# Patient Record
Sex: FEMALE | Race: WHITE | NOT HISPANIC OR LATINO | Employment: OTHER | URBAN - METROPOLITAN AREA
[De-identification: names, ages, dates, MRNs, and addresses within clinical notes are randomized per-mention and may not be internally consistent; named-entity substitution may affect disease eponyms.]

---

## 2018-09-26 ENCOUNTER — APPOINTMENT (OUTPATIENT)
Dept: RADIOLOGY | Facility: CLINIC | Age: 63
End: 2018-09-26
Payer: MEDICARE

## 2018-09-26 ENCOUNTER — OFFICE VISIT (OUTPATIENT)
Dept: URGENT CARE | Facility: CLINIC | Age: 63
End: 2018-09-26
Payer: MEDICARE

## 2018-09-26 VITALS
WEIGHT: 204 LBS | HEIGHT: 68 IN | RESPIRATION RATE: 18 BRPM | OXYGEN SATURATION: 95 % | TEMPERATURE: 99.5 F | HEART RATE: 100 BPM | DIASTOLIC BLOOD PRESSURE: 60 MMHG | SYSTOLIC BLOOD PRESSURE: 100 MMHG | BODY MASS INDEX: 30.92 KG/M2

## 2018-09-26 DIAGNOSIS — M54.42 CHRONIC LEFT-SIDED LOW BACK PAIN WITH LEFT-SIDED SCIATICA: Primary | ICD-10-CM

## 2018-09-26 DIAGNOSIS — M54.42 CHRONIC LEFT-SIDED LOW BACK PAIN WITH LEFT-SIDED SCIATICA: ICD-10-CM

## 2018-09-26 DIAGNOSIS — G89.29 CHRONIC LEFT-SIDED LOW BACK PAIN WITH LEFT-SIDED SCIATICA: ICD-10-CM

## 2018-09-26 DIAGNOSIS — G89.29 CHRONIC LEFT-SIDED LOW BACK PAIN WITH LEFT-SIDED SCIATICA: Primary | ICD-10-CM

## 2018-09-26 PROCEDURE — 72100 X-RAY EXAM L-S SPINE 2/3 VWS: CPT

## 2018-09-26 PROCEDURE — 99213 OFFICE O/P EST LOW 20 MIN: CPT | Performed by: PHYSICIAN ASSISTANT

## 2018-09-26 PROCEDURE — G0463 HOSPITAL OUTPT CLINIC VISIT: HCPCS | Performed by: PHYSICIAN ASSISTANT

## 2018-09-26 RX ORDER — FUROSEMIDE 40 MG/1
40 TABLET ORAL 2 TIMES DAILY
COMMUNITY

## 2018-09-26 RX ORDER — ASPIRIN 325 MG
325 TABLET ORAL DAILY
COMMUNITY

## 2018-09-26 RX ORDER — LANOLIN ALCOHOL/MO/W.PET/CERES
CREAM (GRAM) TOPICAL DAILY
COMMUNITY

## 2018-09-26 RX ORDER — POTASSIUM CHLORIDE 20 MEQ/1
20 TABLET, EXTENDED RELEASE ORAL 2 TIMES DAILY
COMMUNITY
End: 2018-10-19 | Stop reason: SDUPTHER

## 2018-09-26 RX ORDER — METHYLPREDNISOLONE 4 MG/1
TABLET ORAL
Qty: 21 TABLET | Refills: 0 | Status: SHIPPED | OUTPATIENT
Start: 2018-09-26 | End: 2018-10-17 | Stop reason: ALTCHOICE

## 2018-09-26 RX ORDER — FOLIC ACID 1 MG/1
TABLET ORAL DAILY
COMMUNITY

## 2018-09-26 RX ORDER — GABAPENTIN 300 MG/1
100 CAPSULE ORAL 3 TIMES DAILY
COMMUNITY

## 2018-09-26 RX ORDER — SERTRALINE HYDROCHLORIDE 25 MG/1
50 TABLET, FILM COATED ORAL DAILY
COMMUNITY

## 2018-09-26 RX ORDER — METAXALONE 800 MG/1
800 TABLET ORAL 3 TIMES DAILY
Qty: 30 TABLET | Refills: 0 | Status: SHIPPED | OUTPATIENT
Start: 2018-09-26

## 2018-09-26 RX ORDER — DIPHENOXYLATE HYDROCHLORIDE AND ATROPINE SULFATE 2.5; .025 MG/1; MG/1
1 TABLET ORAL DAILY
COMMUNITY

## 2018-09-26 RX ORDER — LEVOTHYROXINE SODIUM 0.1 MG/1
112 TABLET ORAL DAILY
COMMUNITY
End: 2018-10-17 | Stop reason: SDUPTHER

## 2018-09-26 RX ORDER — DONEPEZIL HYDROCHLORIDE 5 MG/1
5 TABLET, FILM COATED ORAL
COMMUNITY

## 2018-09-26 RX ORDER — RANITIDINE 150 MG/1
150 CAPSULE ORAL 2 TIMES DAILY
COMMUNITY

## 2018-09-26 RX ORDER — MIRTAZAPINE 45 MG/1
45 TABLET, FILM COATED ORAL
COMMUNITY
End: 2018-10-19 | Stop reason: SDUPTHER

## 2018-09-26 NOTE — PATIENT INSTRUCTIONS
will start steroids and muscle relaxers  This is an acute on chronic flare with degenerative disc disease on her x-ray  She may need establishment follow up with pain management or spine in the area  We did discuss the comprehensive spine center in physical therapy  Due to her cancer history she is really just looking to get to baseline and more comfort so I discussed some physical therapy and medication for her pain with her  She agrees to this plan

## 2018-09-26 NOTE — PROGRESS NOTES
[address] [facility]        NAME: Eric Hollins is a 66 y o  female  : 1955    MRN: [id number]  DATE: 2018  TIME: 4:08 PM    Assessment and Plan   Chronic left-sided low back pain with left-sided sciatica [M54 42, G89 29]  1  Chronic left-sided low back pain with left-sided sciatica  XR spine lumbar 2 or 3 views injury    Methylprednisolone 4 MG TBPK    metaxalone (SKELAXIN) 800 mg tablet    Ambulatory Referral to Comprehensive [facility]         Patient Instructions      will start steroids and muscle relaxers  This is an acute on chronic flare with degenerative disc disease on her x-ray  She may need establishment follow up with pain management or spine in the area  We did discuss the comprehensive spine center in physical therapy  Due to her cancer history she is really just looking to get to baseline and more comfort so I discussed some physical therapy and medication for her pain with her  She agrees to this plan  Follow up with PCP in 3-5 days  Proceed to  ER if symptoms worsen  Chief Complaint     Chief Complaint   Patient presents with    Back Pain     ongoing, getting worse for the past 5 days +, goes into the lower back/hip/groin, pins/needles into toes         History of Present Illness       66 y o  Female presents to the office with PMH significant for lung cancer with a CC of "hip pain" x  5 days  Pt states she has had chronic LBP since having surgery on a herniated disc at L4L5 six years ago, but is now experiencing new pain "from her back that shoots into her groin and down to her foot " Pt states that there are "pins and needles" down the side of her left leg and calf, and on both the top and bottom of her left foot  She states the left leg frequently goes numb  She denies any trauma, falls, or changes to activity  Pt denies any symptoms on her right lower extremity  No bowel or bladder changes          Review of Systems   Review of Systems      Current Medications       Current Outpatient Prescriptions:     aspirin 325 mg tablet, Take 325 mg by mouth daily, Disp: , Rfl:     cyanocobalamin (VITAMIN B-12) 1,000 mcg tablet, Take by mouth daily, Disp: , Rfl:     donepezil (ARICEPT) 5 mg tablet, Take 5 mg by mouth daily at bedtime, Disp: , Rfl:     folic acid (FOLVITE) 1 mg tablet, Take by mouth daily, Disp: , Rfl:     furosemide (LASIX) 40 mg tablet, Take 40 mg by mouth 2 (two) times a day, Disp: , Rfl:     gabapentin (NEURONTIN) 300 mg capsule, Take 100 mg by mouth 3 (three) times a day, Disp: , Rfl:     levothyroxine 100 mcg tablet, Take 112 mcg by mouth daily, Disp: , Rfl:     mirtazapine (REMERON) 45 MG tablet, Take 45 mg by mouth daily at bedtime, Disp: , Rfl:     multivitamin (THERAGRAN) TABS, Take 1 tablet by mouth daily, Disp: , Rfl:     potassium chloride (K-DUR,KLOR-CON) 20 mEq tablet, Take 20 mEq by mouth 2 (two) times a day, Disp: , Rfl:     ranitidine (ZANTAC) 150 MG capsule, Take 150 mg by mouth 2 (two) times a day, Disp: , Rfl:     sertraline (ZOLOFT) 25 mg tablet, Take 50 mg by mouth daily, Disp: , Rfl:     metaxalone (SKELAXIN) 800 mg tablet, Take 1 tablet (800 mg total) by mouth 3 (three) times a day, Disp: 30 tablet, Rfl: 0    Methylprednisolone 4 MG TBPK, Use as directed on package, Disp: 21 tablet, Rfl: 0    Current Allergies     Allergies as of 09/26/2018 - Reviewed 09/26/2018   Allergen Reaction Noted    Heparin  09/26/2018    Penicillins  09/26/2018    Taxotere [docetaxel]  09/26/2018            The following portions of the patient's history were reviewed and updated as appropriate: allergies, current medications, past family history, past medical history, past social history, past surgical history and problem list      Past Medical History:   Diagnosis Date    Cancer ([facility] [address]     Depression     Disease of thyroid gland     Edema        Past Surgical History:   Procedure Laterality Date    ADENOIDECTOMY      BACK SURGERY   SECTION      CHOLECYSTECTOMY      EYE SURGERY      TONSILLECTOMY         Family History   Problem Relation Age of Onset    Cancer Mother     Cancer Father          Medications have been verified  Objective   /60 (BP Location: Right arm, Patient Position: Sitting, Cuff Size: Large)   Pulse 100   Temp 99 5 °F (37 5 °C) (Tympanic)   Resp 18   Ht 5' 8" (1 727 m)   Wt 92 5 kg (204 lb)   SpO2 95%   BMI 31 02 kg/m²        Physical Exam     Physical Exam   Constitutional: She appears well-developed and well-nourished  Musculoskeletal:     Lumbar spine tender palpation over L3-4 and 5 region  She is tender over bilateral SI joints worse on the left  She has some tenderness over the left buttock and piriformis region  Painful straight leg raise and paresthesias on the left  Lower extremity strength 4/5  On the left and 5/5 on the right  She has painful back flexion, lateral flexion to the left

## 2018-09-27 ENCOUNTER — TELEPHONE (OUTPATIENT)
Dept: PHYSICAL THERAPY | Facility: OTHER | Age: 63
End: 2018-09-27

## 2018-09-28 ENCOUNTER — NURSE TRIAGE (OUTPATIENT)
Dept: PHYSICAL THERAPY | Facility: OTHER | Age: 63
End: 2018-09-28

## 2018-09-28 ENCOUNTER — TELEPHONE (OUTPATIENT)
Dept: PAIN MEDICINE | Facility: MEDICAL CENTER | Age: 63
End: 2018-09-28

## 2018-09-28 DIAGNOSIS — M54.42 CHRONIC BILATERAL LOW BACK PAIN WITH LEFT-SIDED SCIATICA: Primary | ICD-10-CM

## 2018-09-28 DIAGNOSIS — G89.29 CHRONIC BILATERAL LOW BACK PAIN WITH LEFT-SIDED SCIATICA: Primary | ICD-10-CM

## 2018-09-28 NOTE — TELEPHONE ENCOUNTER
Spoke with both patient and patient's sister in law  Patient has stage IV lung cancer and stopped taking chemotherapy 1 5 to 2 years ago  Patient reports no medication for pain or symptoms  Patient had laminectomy 10 years ago  Patient now lives with sister in law and brother  Sister in law, Priyanka Dumont, states patient was in a poor living situation prior to moving in with them  Priyanka Sylvesters states patient has already improved emotionally with a more positive living situation and getting care coordinated for her  Please contact sister in law for appointments - sister in law takes patient to appointments  Reason for Disposition   Is this a chronic condition? Additional Information   Negative: Has the patient experienced major trauma? (fall from height, high speed collision, direct blow to spine) and is also experiencing nausea, light-headedness, or loss of consciousness? 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

## 2018-10-06 ENCOUNTER — TRANSCRIBE ORDERS (OUTPATIENT)
Dept: ADMINISTRATIVE | Facility: HOSPITAL | Age: 63
End: 2018-10-06

## 2018-10-06 ENCOUNTER — APPOINTMENT (OUTPATIENT)
Dept: LAB | Facility: CLINIC | Age: 63
End: 2018-10-06
Payer: MEDICARE

## 2018-10-06 DIAGNOSIS — E03.9 MYXEDEMA HEART DISEASE: Primary | ICD-10-CM

## 2018-10-06 DIAGNOSIS — N18.30 CHRONIC KIDNEY DISEASE, STAGE III (MODERATE) (HCC): ICD-10-CM

## 2018-10-06 DIAGNOSIS — E03.9 ACQUIRED HYPOTHYROIDISM: ICD-10-CM

## 2018-10-06 DIAGNOSIS — I51.9 MYXEDEMA HEART DISEASE: Primary | ICD-10-CM

## 2018-10-06 DIAGNOSIS — Z00.00 ROUTINE GENERAL MEDICAL EXAMINATION AT A HEALTH CARE FACILITY: ICD-10-CM

## 2018-10-06 LAB
ANION GAP SERPL CALCULATED.3IONS-SCNC: 6 MMOL/L (ref 4–13)
BUN SERPL-MCNC: 22 MG/DL (ref 5–25)
CALCIUM SERPL-MCNC: 9.4 MG/DL (ref 8.3–10.1)
CHLORIDE SERPL-SCNC: 111 MMOL/L (ref 100–108)
CO2 SERPL-SCNC: 26 MMOL/L (ref 21–32)
CREAT SERPL-MCNC: 1.55 MG/DL (ref 0.6–1.3)
GFR SERPL CREATININE-BSD FRML MDRD: 36 ML/MIN/1.73SQ M
GLUCOSE P FAST SERPL-MCNC: 96 MG/DL (ref 65–99)
POTASSIUM SERPL-SCNC: 4.3 MMOL/L (ref 3.5–5.3)
SODIUM SERPL-SCNC: 143 MMOL/L (ref 136–145)
T4 FREE SERPL-MCNC: 0.86 NG/DL (ref 0.76–1.46)
TSH SERPL DL<=0.05 MIU/L-ACNC: 9.96 UIU/ML (ref 0.36–3.74)

## 2018-10-06 PROCEDURE — 84439 ASSAY OF FREE THYROXINE: CPT

## 2018-10-06 PROCEDURE — 80048 BASIC METABOLIC PNL TOTAL CA: CPT

## 2018-10-06 PROCEDURE — 36415 COLL VENOUS BLD VENIPUNCTURE: CPT

## 2018-10-06 PROCEDURE — 84443 ASSAY THYROID STIM HORMONE: CPT

## 2018-10-08 ENCOUNTER — EVALUATION (OUTPATIENT)
Dept: PHYSICAL THERAPY | Facility: CLINIC | Age: 63
End: 2018-10-08
Payer: MEDICARE

## 2018-10-08 DIAGNOSIS — M54.42 ACUTE BACK PAIN WITH SCIATICA, LEFT: Primary | ICD-10-CM

## 2018-10-08 PROCEDURE — 97163 PT EVAL HIGH COMPLEX 45 MIN: CPT | Performed by: PHYSICAL THERAPIST

## 2018-10-08 PROCEDURE — 97140 MANUAL THERAPY 1/> REGIONS: CPT | Performed by: PHYSICAL THERAPIST

## 2018-10-08 PROCEDURE — G8978 MOBILITY CURRENT STATUS: HCPCS | Performed by: PHYSICAL THERAPIST

## 2018-10-08 PROCEDURE — G8979 MOBILITY GOAL STATUS: HCPCS | Performed by: PHYSICAL THERAPIST

## 2018-10-08 NOTE — PROGRESS NOTES
PT Evaluation     Today's date: 10/8/2018  Patient name: Tania Renee  : 1955  MRN: [id number]  Referring provider: Dillon Calderón MD  Dx:   Encounter Diagnosis     ICD-10-CM    1  Acute back pain with sciatica, left M54 42        Start Time: 1630  Stop Time: 1720  Total time in clinic (min): 50 minutes    Assessment  Impairments: abnormal gait, abnormal or restricted ROM, activity intolerance, impaired balance, impaired physical strength, lacks appropriate home exercise program, pain with function, poor posture  and poor body mechanics    Assessment details: Tania Renee is a 66 y o  female who presents with pain, decreased strength, decreased ROM and ambulatory dysfunction  Due to these impairments, Patient has difficulty performing a/iadls  Patient's clinical presentation is consistent with their referring diagnosis of back pain  Patient would benefit from skilled physical therapy to address their aforementioned impairments, improve their level of function and to improve their overall quality of life  Barriers to therapy: Stage 4 lung CA  Understanding of Dx/Px/POC: good   Prognosis: fair    Goals  ST-3 WEEKS  1  Decrease pain in low back <5/10   2  Increase Hamstring flexibility > 60 degrees bilateral   3   Improve posture to upright with no abnormalities/neutral alignment  4  Improve balance with DLS > 5 seconds with no AD  LT-6 WEEKS  1  Patient to be independent with a/iadls  2  Increase functional activities for leisure and home activities to previous LOF    3  Independent with HEP and/or fitness program     Plan  Patient would benefit from: skilled physical therapy  Planned modality interventions: cryotherapy and thermotherapy: hydrocollator packs  Planned therapy interventions: activity modification, body mechanics training, aquatic therapy, flexibility, functional ROM exercises, home exercise program, IADL retraining, joint mobilization, manual therapy, neuromuscular re-education, patient education, postural training, strengthening, stretching, therapeutic activities, therapeutic exercise, abdominal trunk stabilization, gait training and balance  Frequency: 2-3x week  Duration in weeks: 12  Plan of Care beginning date: 10/8/2018  Plan of Care expiration date: 2019  Treatment plan discussed with: patient        Subjective Evaluation    History of Present Illness  Date of onset: 2018  Mechanism of injury: Patient has long history of LBP  Patient had episode of sciatica about a month ago  Went to urgent care and referred to [facility]  Recurrent probem    Pain  Current pain ratin  At best pain ratin  At worst pain rating: 10  Location: low back  Quality: radiating and sharp  Relieving factors: change in position and medications  Aggravating factors: standing, walking and sitting  Progression: improved    Social Support  Steps to enter house: yes  Stairs in house: no   Lives with: adult children    Employment status: not working  Hand dominance: right    Treatments  Previous treatment: physical therapy and chiropractic  Patient Goals  Patient goals for therapy: decreased pain, increased motion, increased strength and independence with ADLs/IADLs  Patient goal: walk better        Objective     Static Posture     Comments  Moderate overweight, hip flexion, knee flexion, hips adducted, forward flexed trunk    Tenderness     Additional Tenderness Details  Tender bilateral low back general area  Active Range of Motion     Lumbar   Flexion: Active lumbar flexion: tips to mid shin  Extension: -10 degrees   Left lateral flexion: WFL  Right lateral flexion: [facility]    Additional Active Range of Motion Details  Significant tight hamstrings bilateral left: 45 degrees with pain, right 50 degrees      Strength/Myotome Testing     Additional Strength Details  Grossly 3/5 BLE's,  Core/abdominals: 3-/5    Ambulation     Ambulation: Level Surfaces   Ambulation with assistive device: independent    Additional Level Surfaces Ambulation Details  Patient using SPC for ambulation, forward flexed trunk with knees and hips flexed, and legs together  Functional Assessment     Comments  DLS: without support: 2 seconds        Flowsheet Rows      Most Recent Value   PT/OT G-Codes   Current Score  28   Projected Score  40   Assessment Type  Evaluation   G code set  Mobility: Walking & Moving Around   Mobility: Walking and Moving Around Current Status ()  CL   Mobility: Walking and Moving Around Goal Status ()  CL          Precautions: stage 4 lung cancer, laminectomy L 4-5 15 yrs ago, neuropathy BLE's,     Daily Treatment Diary   Modalities  10/8            HT 10'                                          Manual  10/8            Gentle LB/LE 8'                                                                    Exercise Diary

## 2018-10-12 ENCOUNTER — OFFICE VISIT (OUTPATIENT)
Dept: PHYSICAL THERAPY | Facility: CLINIC | Age: 63
End: 2018-10-12
Payer: MEDICARE

## 2018-10-12 DIAGNOSIS — M54.42 ACUTE BACK PAIN WITH SCIATICA, LEFT: Primary | ICD-10-CM

## 2018-10-12 DIAGNOSIS — G89.29 CHRONIC BILATERAL LOW BACK PAIN WITH LEFT-SIDED SCIATICA: ICD-10-CM

## 2018-10-12 DIAGNOSIS — M54.42 CHRONIC BILATERAL LOW BACK PAIN WITH LEFT-SIDED SCIATICA: ICD-10-CM

## 2018-10-12 PROCEDURE — 97113 AQUATIC THERAPY/EXERCISES: CPT | Performed by: PHYSICAL THERAPIST

## 2018-10-12 NOTE — PROGRESS NOTES
Daily Note     Today's date: 10/12/2018  Patient name: Ida Lao  : 1955  MRN: [id number]  Referring provider: Giancarlo Hernandez MD  Dx:   Encounter Diagnosis     ICD-10-CM    1  Acute back pain with sciatica, left M54 42    2  Chronic bilateral low back pain with left-sided sciatica M54 42     G89 29        Start Time: 1515  Stop Time: 1600  Total time in clinic (min): 45 minutes    Subjective: Patient reports she has pain 5/10  Objective: See treatment diary below      Assessment: Tolerated treatment fair  Patient would benefit from continued PT Patient felt better after session, but had increased pain during water exercises  Patient instructed in HEP, posture, and safe ambulation  Plan: Continue per plan of care       Exercise Diary  10/12            Water walking 10'            Postural training 3'            Gait training 3'            Home exercise pgm/patient education 1'            Wall: t/h raises 1'            Hip abd/add             Marching             squats 1'            Knee flex/ext 1'            Step-ups (fwd/bkwd/ss)             SLS (eyes open/closed)             SLS w UE mvmt  AROM/ball toss             Weight shifting             UE Noodle work x 4              UE AROM             Resistive UE work (paddles, bells, TB)             Core work on noodle (sitting/stdg)             Sit on noodle with movement             Seated on pool bench w proper posture             Ankle df/pf             marching             Hip Ab/add             Knee flex/ext             Deep water mvmt             Deep water tx/stretching 10'            Specific self - stretches wall/steps 5'                Modalities  10/12            whirlpool 10'

## 2018-10-16 ENCOUNTER — OFFICE VISIT (OUTPATIENT)
Dept: PHYSICAL THERAPY | Facility: CLINIC | Age: 63
End: 2018-10-16
Payer: MEDICARE

## 2018-10-16 DIAGNOSIS — M54.42 CHRONIC BILATERAL LOW BACK PAIN WITH LEFT-SIDED SCIATICA: ICD-10-CM

## 2018-10-16 DIAGNOSIS — M54.42 ACUTE BACK PAIN WITH SCIATICA, LEFT: Primary | ICD-10-CM

## 2018-10-16 DIAGNOSIS — G89.29 CHRONIC BILATERAL LOW BACK PAIN WITH LEFT-SIDED SCIATICA: ICD-10-CM

## 2018-10-16 PROCEDURE — 97113 AQUATIC THERAPY/EXERCISES: CPT | Performed by: PHYSICAL THERAPIST

## 2018-10-16 NOTE — PROGRESS NOTES
Daily Note     Today's date: 10/16/2018  Patient name: Catina Belle  : 1955  MRN: [id number]  Referring provider: Heena Quiros MD  Dx:   Encounter Diagnosis     ICD-10-CM    1  Acute back pain with sciatica, left M54 42    2  Chronic bilateral low back pain with left-sided sciatica M54 42     G89 29                   Subjective: Patient complains of LBP at 8/10, but notes less left leg pain  Objective: See treatment diary below      Exercise Diary  10/12 10/16           Water walking 10' 10           Postural training 3' 3           Gait training 3' 3           Home exercise pgm/patient education            Wall: t/h raises            Hip abd/add  1             2           squats            Knee flex/ext 1           Step-ups (fwd/bkwd/ss)             SLS (eyes open/closed)             SLS w UE mvmt  AROM/ball toss             Weight shifting             UE Noodle work x 4              UE AROM             Resistive UE work (paddles, bells, TB)             Core work on noodle (sitting/stdg)             Sit on noodle with movement             Seated on pool bench w proper posture  1           Ankle df/pf  1                      Hip Ab/add  1           Knee flex/ext  1           Deep water mvmt             Deep water tx/stretching 10' 10           Specific self - stretches wall/steps 5' 5               Modalities  10/12 10/12           whirlpool 10' 10                 Assessment: Tolerated treatment well  Patient exhibited good technique with therapeutic exercises, no complaints of pain with exercises in the pool  Plan: Progress treatment as tolerated

## 2018-10-17 ENCOUNTER — TELEPHONE (OUTPATIENT)
Dept: FAMILY MEDICINE CLINIC | Facility: CLINIC | Age: 63
End: 2018-10-17

## 2018-10-17 ENCOUNTER — OFFICE VISIT (OUTPATIENT)
Dept: FAMILY MEDICINE CLINIC | Facility: CLINIC | Age: 63
End: 2018-10-17
Payer: MEDICARE

## 2018-10-17 VITALS
HEART RATE: 86 BPM | OXYGEN SATURATION: 97 % | SYSTOLIC BLOOD PRESSURE: 128 MMHG | RESPIRATION RATE: 16 BRPM | WEIGHT: 218 LBS | DIASTOLIC BLOOD PRESSURE: 66 MMHG | TEMPERATURE: 98.1 F | BODY MASS INDEX: 33.04 KG/M2 | HEIGHT: 68 IN

## 2018-10-17 DIAGNOSIS — Z12.11 SCREENING FOR COLON CANCER: ICD-10-CM

## 2018-10-17 DIAGNOSIS — E03.9 HYPOTHYROIDISM, UNSPECIFIED TYPE: ICD-10-CM

## 2018-10-17 DIAGNOSIS — C34.90 MALIGNANT NEOPLASM OF LUNG, UNSPECIFIED LATERALITY, UNSPECIFIED PART OF LUNG (HCC): Primary | ICD-10-CM

## 2018-10-17 DIAGNOSIS — Z11.59 NEED FOR HEPATITIS C SCREENING TEST: ICD-10-CM

## 2018-10-17 DIAGNOSIS — Z13.220 NEED FOR LIPID SCREENING: ICD-10-CM

## 2018-10-17 DIAGNOSIS — I63.9 CEREBROVASCULAR ACCIDENT (CVA), UNSPECIFIED MECHANISM (HCC): ICD-10-CM

## 2018-10-17 DIAGNOSIS — E03.9 HYPOTHYROIDISM, UNSPECIFIED TYPE: Primary | ICD-10-CM

## 2018-10-17 DIAGNOSIS — Z13.1 DIABETES MELLITUS SCREENING: ICD-10-CM

## 2018-10-17 DIAGNOSIS — Z23 NEED FOR INFLUENZA VACCINATION: ICD-10-CM

## 2018-10-17 PROCEDURE — 90471 IMMUNIZATION ADMIN: CPT

## 2018-10-17 PROCEDURE — 90682 RIV4 VACC RECOMBINANT DNA IM: CPT

## 2018-10-17 PROCEDURE — 99204 OFFICE O/P NEW MOD 45 MIN: CPT | Performed by: FAMILY MEDICINE

## 2018-10-17 RX ORDER — LEVOTHYROXINE SODIUM 112 UG/1
112 TABLET ORAL DAILY
Qty: 30 TABLET | Refills: 1 | Status: SHIPPED | OUTPATIENT
Start: 2018-10-17 | End: 2018-10-19 | Stop reason: SDUPTHER

## 2018-10-17 NOTE — TELEPHONE ENCOUNTER
When patient was checking out, they were waiting on blood work to be ordered  Could you please put through what blood work you need to hava done  I'll call her when it is ready       Thank you

## 2018-10-17 NOTE — PROGRESS NOTES
Assessment/Plan:    No problem-specific Assessment & Plan notes found for this encounter  Diagnoses and all orders for this visit:    Malignant neoplasm of lung, unspecified laterality, unspecified part of lung [facility])  Not interested ailin treatment advised to sign a release of records  Screening for colon cancer  -     Ambulatory referral to Gastroenterology; Future    Hypothyroidism, unspecified type  -     levothyroxine 112 mcg tablet; Take 1 tablet (112 mcg total) by mouth daily for 30 days    Need for influenza vaccination  -     influenza vaccine, [id number], quadrivalent, recombinant, PF, 0 5 mL, for patients 18 yr+ ([facility])    [facility]  Advised to sign a release of records in regards to this  Currently on high dose aspirin  Follow up in 1 month or as needed          Subjective:      Patient ID: Avila Tejeda is a 66 y o  female  Patient is here to establish care  Per patient she has a history of lung cancer and used to be on chemotherapy however she stopped taking her chemotherapy treatments some years ago given that the she was having some side effects from treatments and tumor were not shrinking  She has not been taking therapy treatment for several years at this time  She also has a history of a stroke several years ago  She denies any problems with speech at this time however she does need help walking and uses a cane to ambulate  She needs help with activities of daily living such as cooking transferring walking and sometimes showering as well  Has a history hypothyroidism takes medications daily denies symptoms at this time  Most recent TSH was elevated  The following portions of the patient's history were reviewed and updated as appropriate:   She  has a past medical history of Cancer ([facility] ); Depression; Disease of thyroid gland; and Edema    She   Patient Active Problem List    Diagnosis Date Noted    [facility] 10/17/2018    Screening for colon cancer 10/17/2018    Hypothyroidism 10/17/2018    Need for influenza vaccination 10/17/2018    C[facility]vascular [facility] ([facility]) [facility] 10/17/2018     She  has a past surgical history that includes Back surgery; Cholecystectomy; Tonsillectomy; ADENOIDECTOMY;  section; and Eye surgery  Her family history includes Cancer in her father and mother  She  reports that she has been smoking  She has never used smokeless tobacco  Her alcohol and drug histories are not on file  Current Outpatient Prescriptions   Medication Sig Dispense Refill    aspirin 325 mg tablet Take 325 mg by mouth daily      cyanocobalamin (VITAMIN B-12) 1,000 mcg tablet Take by mouth daily      donepezil (ARICEPT) 5 mg tablet Take 5 mg by mouth daily at bedtime      folic acid (FOLVITE) 1 mg tablet Take by mouth daily      furosemide (LASIX) 40 mg tablet Take 40 mg by mouth 2 (two) times a day      gabapentin (NEURONTIN) 300 mg capsule Take 100 mg by mouth 3 (three) times a day      levothyroxine 112 mcg tablet Take 1 tablet (112 mcg total) by mouth daily for 30 days 30 tablet 1    mirtazapine (REMERON) 45 MG tablet Take 45 mg by mouth daily at bedtime      multivitamin (THERAGRAN) TABS Take 1 tablet by mouth daily      potassium chloride (K-DUR,KLOR-CON) 20 mEq tablet Take 20 mEq by mouth 2 (two) times a day      ranitidine (ZANTAC) 150 MG capsule Take 150 mg by mouth 2 (two) times a day      sertraline (ZOLOFT) 25 mg tablet Take 50 mg by mouth daily      metaxalone (SKELAXIN) 800 mg tablet Take 1 tablet (800 mg total) by mouth 3 (three) times a day (Patient not taking: Reported on 10/17/2018 ) 30 tablet 0     No current facility-administered medications for this visit        Current Outpatient Prescriptions on File Prior to Visit   Medication Sig    aspirin 325 mg tablet Take 325 mg by mouth daily    cyanocobalamin (VITAMIN B-12) 1,000 mcg tablet Take by mouth daily    donepezil (ARICEPT) 5 mg tablet Take 5 mg by mouth daily at bedtime    folic acid (FOLVITE) 1 mg tablet Take by mouth daily    furosemide (LASIX) 40 mg tablet Take 40 mg by mouth 2 (two) times a day    gabapentin (NEURONTIN) 300 mg capsule Take 100 mg by mouth 3 (three) times a day    mirtazapine (REMERON) 45 MG tablet Take 45 mg by mouth daily at bedtime    multivitamin (THERAGRAN) TABS Take 1 tablet by mouth daily    potassium chloride (K-DUR,KLOR-CON) 20 mEq tablet Take 20 mEq by mouth 2 (two) times a day    ranitidine (ZANTAC) 150 MG capsule Take 150 mg by mouth 2 (two) times a day    sertraline (ZOLOFT) 25 mg tablet Take 50 mg by mouth daily    [DISCONTINUED] levothyroxine 100 mcg tablet Take 112 mcg by mouth daily    metaxalone (SKELAXIN) 800 mg tablet Take 1 tablet (800 mg total) by mouth 3 (three) times a day (Patient not taking: Reported on 10/17/2018 )    [DISCONTINUED] Methylprednisolone 4 MG TBPK Use as directed on package (Patient not taking: Reported on 10/17/2018 )     No current facility-administered medications on file prior to visit  She is allergic to heparin; penicillins; and taxotere [docetaxel]       Review of Systems   Constitutional: Negative for activity change, appetite change, fatigue and fever  HENT: Negative for congestion and ear discharge  Respiratory: Negative for cough and shortness of breath  Cardiovascular: Negative for chest pain and palpitations  Gastrointestinal: Negative for diarrhea and nausea  Musculoskeletal: Negative for arthralgias and back pain  Skin: Negative for color change and rash  Neurological: Negative for dizziness and headaches  Psychiatric/Behavioral: Negative for agitation and behavioral problems           Objective:      /66 (BP Location: Left arm, Patient Position: Sitting, Cuff Size: Adult)   Pulse 86   Temp 98 1 °F (36 7 °C) (Tympanic)   Resp 16   Ht 5' 8" (1 727 m)   Wt 98 9 kg (218 lb)   SpO2 97%   BMI 33 15 kg/m² Physical Exam   Constitutional: She is oriented to person, place, and time  She appears well-developed and well-nourished  No distress  HENT:   Head: Normocephalic and atraumatic  Nose: Nose normal    Mouth/Throat: Oropharynx is clear and moist    Eyes: Pupils are equal, round, and reactive to light  Conjunctivae are normal    Cardiovascular: Normal rate, regular rhythm and normal heart sounds  No murmur heard  Pulmonary/Chest: Effort normal and breath sounds normal  No respiratory distress  She has no wheezes  Abdominal: Soft  Bowel sounds are normal  She exhibits no distension  There is no tenderness  Neurological: She is alert and oriented to person, place, and time  Skin: Skin is warm and dry  No rash noted  She is not diaphoretic  No erythema  Psychiatric: She has a normal mood and affect

## 2018-10-18 NOTE — TELEPHONE ENCOUNTER
Lm on vm for pt or sister in law Nan to c/b  Dr Norberto Mcmahan does not want to see pt for medication management  Pls clarify what the pt is being seen for

## 2018-10-18 NOTE — TELEPHONE ENCOUNTER
Lamin Arambula called stating the patient is being seen for low back pain  She is not taking any medication for this

## 2018-10-19 ENCOUNTER — APPOINTMENT (OUTPATIENT)
Dept: PHYSICAL THERAPY | Facility: CLINIC | Age: 63
End: 2018-10-19
Payer: MEDICARE

## 2018-10-19 DIAGNOSIS — F32.A DEPRESSION, UNSPECIFIED DEPRESSION TYPE: ICD-10-CM

## 2018-10-19 DIAGNOSIS — E03.9 HYPOTHYROIDISM, UNSPECIFIED TYPE: ICD-10-CM

## 2018-10-19 DIAGNOSIS — E87.6 HYPOKALEMIA: Primary | ICD-10-CM

## 2018-10-19 RX ORDER — POTASSIUM CHLORIDE 20 MEQ/1
20 TABLET, EXTENDED RELEASE ORAL 2 TIMES DAILY
Qty: 180 TABLET | Refills: 0 | Status: SHIPPED | OUTPATIENT
Start: 2018-10-19 | End: 2019-01-17 | Stop reason: SDUPTHER

## 2018-10-19 RX ORDER — MIRTAZAPINE 45 MG/1
45 TABLET, FILM COATED ORAL
Qty: 30 TABLET | Refills: 2 | Status: SHIPPED | OUTPATIENT
Start: 2018-10-19 | End: 2019-02-19 | Stop reason: SDUPTHER

## 2018-10-19 RX ORDER — LEVOTHYROXINE SODIUM 112 UG/1
112 TABLET ORAL DAILY
Qty: 30 TABLET | Refills: 0 | Status: SHIPPED | OUTPATIENT
Start: 2018-10-19 | End: 2018-11-18

## 2018-10-22 ENCOUNTER — OFFICE VISIT (OUTPATIENT)
Dept: PHYSICAL THERAPY | Facility: CLINIC | Age: 63
End: 2018-10-22
Payer: MEDICARE

## 2018-10-22 DIAGNOSIS — G89.29 CHRONIC BILATERAL LOW BACK PAIN WITH LEFT-SIDED SCIATICA: ICD-10-CM

## 2018-10-22 DIAGNOSIS — M54.42 ACUTE BACK PAIN WITH SCIATICA, LEFT: Primary | ICD-10-CM

## 2018-10-22 DIAGNOSIS — M54.42 CHRONIC BILATERAL LOW BACK PAIN WITH LEFT-SIDED SCIATICA: ICD-10-CM

## 2018-10-22 PROCEDURE — 97113 AQUATIC THERAPY/EXERCISES: CPT

## 2018-10-22 NOTE — PROGRESS NOTES
Daily Note     Today's date: 10/22/2018  Patient name: Denita Goltz  : 1955  MRN: [id number]  Referring provider: Dany Oneal MD  Dx:   Encounter Diagnosis     ICD-10-CM    1  Acute back pain with sciatica, left M54 42    2  Chronic bilateral low back pain with left-sided sciatica M54 42     G89 [id number]        Start Time: 1105  Stop Time: 1158  Total time in clinic (min): 53 minutes    Subjective:  Patient presents with use of SPC, forward posture and slow gait pattern  Patient c/o LBP at 7/10  Objective: See treatment diary below      Exercise Diary  10/12 10/16 10/22          Water walking 10' 10 10          Postural training 3' 3 2          Gait training 3' 3 2          Home exercise pgm/patient education            Wall: t/h raises 1' 1 1          Hip abd/add  1 2            2 2          squats 1'            Knee flex/ext 1' 1 1          Step-ups (fwd/bkwd/ss)             SLS (eyes open/closed)             SLS w UE mvmt  AROM/ball toss             Weight shifting             UE Noodle work x 4    3'          UE AROM             Resistive UE work (paddles, bells, TB)             Core work on noodle (sitting/stdg)             Sit on noodle with movement             Seated on pool bench w proper posture  1 1          Ankle df/pf  1 1            1 1          Hip Ab/add  1 1          Knee flex/ext  1 1          Deep water mvmt             Deep water tx/stretching 10 10 10'          Specific self - stretches wall/steps 5' 5 5'              Modalities  10/12 10/12 10/22          whirlpool 10 10 10'                Assessment: Tolerated aquatic treatment fairly well  Patient exhibited good technique with some exercises  Water walking in intervals due to increase in LBP with walking  Tolerated all exercise fairly but movements were slow and not always correct  Close supervision and guidance to carry out program properly   Patient had a coughing attack at the end of program which bothered her back a lot  Plan: Progress treatment as tolerated

## 2018-10-29 ENCOUNTER — OFFICE VISIT (OUTPATIENT)
Dept: PHYSICAL THERAPY | Facility: CLINIC | Age: 63
End: 2018-10-29
Payer: MEDICARE

## 2018-10-29 DIAGNOSIS — M54.42 ACUTE BACK PAIN WITH SCIATICA, LEFT: Primary | ICD-10-CM

## 2018-10-29 DIAGNOSIS — M54.42 CHRONIC BILATERAL LOW BACK PAIN WITH LEFT-SIDED SCIATICA: ICD-10-CM

## 2018-10-29 DIAGNOSIS — G89.29 CHRONIC BILATERAL LOW BACK PAIN WITH LEFT-SIDED SCIATICA: ICD-10-CM

## 2018-10-29 PROCEDURE — 97113 AQUATIC THERAPY/EXERCISES: CPT

## 2018-10-29 NOTE — PROGRESS NOTES
Daily Note     Today's date: 10/29/2018  Patient name: Jacque Canada  : 1955  MRN: [id number]  Referring provider: Anahi Honeycutt MD  Dx:   Encounter Diagnosis     ICD-10-CM    1  Acute back pain with sciatica, left M54 42    2  Chronic bilateral low back pain with left-sided sciatica M54 42     G89 [id number]        Start Time: 1345  Stop Time: 1440  Total time in clinic (min): 55 minutes    Subjective:  Patient presents without use of SPC today due to having assistance of two family members with her, forward posture and slow gait pattern continue  Patient continues to c/o LBP at 7/10 with no significant changes to report  BP:  112/77         Pulse:   82  O2:95  Post - pool pre - WP  Objective: See treatment diary below  Precautions:  Stage 4 lung cancer, L4 - 5 laminectomy, BLE neuropathy, 3 strokes    Exercise Diary  10/12 10/16 10/22 10/29         Water walking 10' 10 10' 10'         Postural training 3' 3 2 2'         Gait training 3' 3 2 2'         Home exercise pgm/patient education 1' 1           Wall: t/h raises 1' 1 1 1         Hip abd/add  1 2 2         Marching  2 2 2         squats 1' 1           Knee flex/ext 1' 1 1 1         Step-ups (fwd/bkwd/ss)             SLS (eyes open/closed)             SLS w UE mvmt  AROM/ball toss             Weight shifting             UE Noodle work x 4    3' 5'         UE AROM             Resistive UE work (paddles, bells, TB)             Core work on noodle (sitting/stdg)             Sit on noodle with movement             Seated on pool bench w proper posture  1 1 1         Ankle df/pf  1 1 1         marching  1 1 1         Hip Ab/add  1 1 1         Knee flex/ext  1 1 1         Deep water mvmt             Deep water tx/stretching 10' 10 10' 10'         Specific self - stretches wall/steps 5' 5 5' 5'             Modalities  10/12 10/12 10/22 10/29         whirlpool 10' 10 10' 10'               Assessment: Tolerated aquatic treatment fairly well   Patient exhibited good technique with most exercises  Discussed ADL's and energy conservation techniques  Patient discussed history of 3 strokes leaving her with memory issues and balance issues  Close supervision and guidance while in water  Patient has strong family support system, lives with extended family and they're helpful and supportive  Plan: Progress treatment as tolerated

## 2019-01-17 DIAGNOSIS — E87.6 HYPOKALEMIA: ICD-10-CM

## 2019-01-17 RX ORDER — POTASSIUM CHLORIDE 1500 MG/1
20 TABLET, EXTENDED RELEASE ORAL 2 TIMES DAILY
Qty: 180 TABLET | Refills: 0 | Status: SHIPPED | OUTPATIENT
Start: 2019-01-17

## 2019-02-19 DIAGNOSIS — F32.A DEPRESSION, UNSPECIFIED DEPRESSION TYPE: ICD-10-CM

## 2019-02-19 RX ORDER — MIRTAZAPINE 45 MG/1
45 TABLET, FILM COATED ORAL
Qty: 90 TABLET | Refills: 2 | Status: SHIPPED | OUTPATIENT
Start: 2019-02-19 | End: 2019-02-20 | Stop reason: SDUPTHER

## 2019-02-20 DIAGNOSIS — F32.A DEPRESSION, UNSPECIFIED DEPRESSION TYPE: ICD-10-CM

## 2019-02-20 RX ORDER — MIRTAZAPINE 45 MG/1
45 TABLET, FILM COATED ORAL
Qty: 90 TABLET | Refills: 3 | Status: SHIPPED | OUTPATIENT
Start: 2019-02-20